# Patient Record
Sex: FEMALE | Race: WHITE | URBAN - METROPOLITAN AREA
[De-identification: names, ages, dates, MRNs, and addresses within clinical notes are randomized per-mention and may not be internally consistent; named-entity substitution may affect disease eponyms.]

---

## 2017-08-08 ENCOUNTER — APPOINTMENT (RX ONLY)
Dept: URBAN - METROPOLITAN AREA CLINIC 122 | Facility: CLINIC | Age: 47
Setting detail: DERMATOLOGY
End: 2017-08-08

## 2017-08-08 DIAGNOSIS — L259 CONTACT DERMATITIS AND OTHER ECZEMA, UNSPECIFIED CAUSE: ICD-10-CM

## 2017-08-08 DIAGNOSIS — L29.8 OTHER PRURITUS: ICD-10-CM

## 2017-08-08 DIAGNOSIS — L29.89 OTHER PRURITUS: ICD-10-CM

## 2017-08-08 PROBLEM — L23.9 ALLERGIC CONTACT DERMATITIS, UNSPECIFIED CAUSE: Status: ACTIVE | Noted: 2017-08-08

## 2017-08-08 PROCEDURE — ? PRESCRIPTION

## 2017-08-08 PROCEDURE — 99213 OFFICE O/P EST LOW 20 MIN: CPT

## 2017-08-08 RX ORDER — BETAMETHASONE DIPROPIONATE 0.5 MG/G
CREAM TOPICAL
Qty: 50 | Refills: 4 | Status: ERX | COMMUNITY
Start: 2017-08-08

## 2017-08-08 RX ORDER — PREDNISONE 10 MG/1
TABLET ORAL
Qty: 30 | Refills: 0 | Status: ERX | COMMUNITY
Start: 2017-08-08

## 2017-08-08 RX ADMIN — PREDNISONE 1: 10 TABLET ORAL at 00:00

## 2017-08-08 RX ADMIN — BETAMETHASONE DIPROPIONATE 1: 0.5 CREAM TOPICAL at 00:00

## 2017-08-08 NOTE — HPI: RASH
[facility] Group note  Teaching Record: 11:00am-12;00pm  Information / activity: Lecture   Stress Management     Good participation      Jose Reyes LCSW          Teaching Record: 10:00am-11:00am  Information / activity: Lecture   Regret     Good participation      Jose Reyes LCSW             Observations: 8:30am-10:00am    Engaged in Activity / Process and Self -disclosed: Yes  Applies Topic to self: Yes  Able to give Constructive Feedback: Yes  Affect: anxious  Degree of Insightful Thinking 5  Notes:  Pt actively participated in group process. Pt verbalized powerlessness. Pt continues to explore relationship with medication and internal barriers to overcome to become medication free.       Jose Reyes LCSW            
How Severe Is Your Rash?: moderate
Is This A New Presentation, Or A Follow-Up?: Rash